# Patient Record
Sex: MALE | Race: WHITE
[De-identification: names, ages, dates, MRNs, and addresses within clinical notes are randomized per-mention and may not be internally consistent; named-entity substitution may affect disease eponyms.]

---

## 2020-06-08 NOTE — NUR
THIS IS A 60 YO M W/ C/O BODY ACHES X3-4 DAYS. PT REPORTS WORSE IN LOW BACK AND 
LEFT SIDE OF NECK 8/10. PT DENIES SOB/CP/PAIN W/ URINATION/COUGH/FEVERS. 
REPORTS NO RECENT TRAVEL. PT DENIES HOME MEDS/MED HX. PT RESTING ON [employer] W/ 
CALL LIGHT IN REACH, CONNECTED TO ALL MONITORING, VSS, NADN.  AT 
BEDSIDE FOR ED EVAL.

## 2020-06-10 NOTE — NUR
THROUGHPUT RN: PT CAME BACK POSITIVE FOR COVID-19. NO INFORMATION FOR ADDRESS, 
PHONE NUMBER, NEXT OF KIN, PERSON TO NOTIFY, UNKNOWN IF PT CAME BY [facility]. SPOKE 
W/ SILVIO FU WHO STATES THEY WILL ASSESS AND LOOK INTO PT SITUATION. [facility] SHOULD 
BE LOOKING INTO THIS AS WELL.

## 2020-08-18 ENCOUNTER — HOSPITAL ENCOUNTER (EMERGENCY)
Dept: HOSPITAL 8 - ED | Age: 60
Discharge: HOME | End: 2020-08-18
Payer: COMMERCIAL

## 2020-08-18 VITALS — HEIGHT: 67 IN | WEIGHT: 179.02 LBS | BODY MASS INDEX: 28.1 KG/M2

## 2020-08-18 VITALS — SYSTOLIC BLOOD PRESSURE: 142 MMHG | DIASTOLIC BLOOD PRESSURE: 80 MMHG
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
## 2020-08-18 DIAGNOSIS — I51.7: ICD-10-CM

## 2020-08-18 DIAGNOSIS — S43.402A: Primary | ICD-10-CM
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
## 2020-08-18 PROCEDURE — 73030 X-RAY EXAM OF SHOULDER: CPT

## 2020-08-18 PROCEDURE — 99283 EMERGENCY DEPT VISIT LOW MDM: CPT

## 2020-08-18 PROCEDURE — 96372 THER/PROPH/DIAG INJ SC/IM: CPT

## 2020-08-18 PROCEDURE — 93005 ELECTROCARDIOGRAM TRACING: CPT

## 2020-08-18 PROCEDURE — 29105 APPLICATION LONG ARM SPLINT: CPT

## 2020-08-18 NOTE — NUR
PT D/C WITH D/C SUMMARY AND SCRIPTS. ALL QUESTIONS ANSWERED. PT AMBULATES TO 
REGISTRATION DESK WITH STEADY GAIT FOR D/C HOME. PT DENIES ANY OTHER NEEDS 
PERTAINING TO THIS VISIT. WORK NOTE PROVIDED BY PROVIDED FOR LIMITATION OF 
ACTIVITY AND REFERRAL TO [facility] OP.

## 2020-08-18 NOTE — NUR
PT MEDICATED PER MAR. PT RESTING COMFORTABLY IN [facility] AT THIS TIME; NAD.

## 2020-11-18 ENCOUNTER — HOSPITAL ENCOUNTER (EMERGENCY)
Dept: HOSPITAL 8 - ED | Age: 60
Discharge: HOME | End: 2020-11-18
Payer: COMMERCIAL

## 2020-11-18 VITALS — DIASTOLIC BLOOD PRESSURE: 74 MMHG | SYSTOLIC BLOOD PRESSURE: 128 MMHG

## 2020-11-18 VITALS — WEIGHT: 184.09 LBS | HEIGHT: 68 IN | BODY MASS INDEX: 27.9 KG/M2

## 2020-11-18 DIAGNOSIS — R94.31: ICD-10-CM

## 2020-11-18 DIAGNOSIS — R53.83: ICD-10-CM

## 2020-11-18 DIAGNOSIS — R51.9: ICD-10-CM

## 2020-11-18 DIAGNOSIS — R11.2: ICD-10-CM

## 2020-11-18 DIAGNOSIS — B34.9: Primary | ICD-10-CM

## 2020-11-18 LAB
ALBUMIN SERPL-MCNC: 3.7 G/DL (ref 3.4–5)
ALP SERPL-CCNC: 78 U/L (ref 45–117)
ALT SERPL-CCNC: 46 U/L (ref 12–78)
ANION GAP SERPL CALC-SCNC: 7 MMOL/L (ref 5–15)
BASOPHILS # BLD AUTO: 0.1 X10^3/UL (ref 0–0.1)
BASOPHILS NFR BLD AUTO: 1 % (ref 0–1)
BILIRUB SERPL-MCNC: 0.8 MG/DL (ref 0.2–1)
CALCIUM SERPL-MCNC: 8.7 MG/DL (ref 8.5–10.1)
CHLORIDE SERPL-SCNC: 112 MMOL/L (ref 98–107)
CREAT SERPL-MCNC: 1.1 MG/DL (ref 0.7–1.3)
EOSINOPHIL # BLD AUTO: 0.1 X10^3/UL (ref 0–0.4)
EOSINOPHIL NFR BLD AUTO: 1 % (ref 1–7)
ERYTHROCYTE [DISTWIDTH] IN BLOOD BY AUTOMATED COUNT: 13.8 % (ref 9.4–14.8)
LYMPHOCYTES # BLD AUTO: 1.4 X10^3/UL (ref 1–3.4)
LYMPHOCYTES NFR BLD AUTO: 21 % (ref 22–44)
MCH RBC QN AUTO: 31.4 PG (ref 27.5–34.5)
MCHC RBC AUTO-ENTMCNC: 34 G/DL (ref 33.2–36.2)
MD: NO
MONOCYTES # BLD AUTO: 0.6 X10^3/UL (ref 0.2–0.8)
MONOCYTES NFR BLD AUTO: 9 % (ref 2–9)
NEUTROPHILS # BLD AUTO: 4.5 X10^3/UL (ref 1.8–6.8)
NEUTROPHILS NFR BLD AUTO: 68 % (ref 42–75)
PLATELET # BLD AUTO: 329 X10^3/UL (ref 130–400)
PMV BLD AUTO: 7.5 FL (ref 7.4–10.4)
PROT SERPL-MCNC: 7.2 G/DL (ref 6.4–8.2)
RBC # BLD AUTO: 4.62 X10^6/UL (ref 4.38–5.82)

## 2020-11-18 PROCEDURE — 36415 COLL VENOUS BLD VENIPUNCTURE: CPT

## 2020-11-18 PROCEDURE — 99285 EMERGENCY DEPT VISIT HI MDM: CPT

## 2020-11-18 PROCEDURE — 96374 THER/PROPH/DIAG INJ IV PUSH: CPT

## 2020-11-18 PROCEDURE — 83690 ASSAY OF LIPASE: CPT

## 2020-11-18 PROCEDURE — 85025 COMPLETE CBC W/AUTO DIFF WBC: CPT

## 2020-11-18 PROCEDURE — 96375 TX/PRO/DX INJ NEW DRUG ADDON: CPT

## 2020-11-18 PROCEDURE — 71045 X-RAY EXAM CHEST 1 VIEW: CPT

## 2020-11-18 PROCEDURE — 80053 COMPREHEN METABOLIC PANEL: CPT

## 2020-11-18 PROCEDURE — 96361 HYDRATE IV INFUSION ADD-ON: CPT

## 2020-11-18 PROCEDURE — 93005 ELECTROCARDIOGRAM TRACING: CPT

## 2020-11-24 ENCOUNTER — HOSPITAL ENCOUNTER (EMERGENCY)
Dept: HOSPITAL 8 - ED | Age: 60
Discharge: HOME | End: 2020-11-24
Payer: COMMERCIAL

## 2020-11-24 VITALS — DIASTOLIC BLOOD PRESSURE: 87 MMHG | SYSTOLIC BLOOD PRESSURE: 119 MMHG

## 2020-11-24 VITALS — WEIGHT: 183.87 LBS | HEIGHT: 68 IN | BODY MASS INDEX: 27.87 KG/M2

## 2020-11-24 DIAGNOSIS — M79.10: Primary | ICD-10-CM

## 2020-11-24 DIAGNOSIS — Z20.828: ICD-10-CM

## 2020-11-24 DIAGNOSIS — R51.9: ICD-10-CM

## 2020-11-24 DIAGNOSIS — R11.2: ICD-10-CM

## 2020-11-24 PROCEDURE — 99283 EMERGENCY DEPT VISIT LOW MDM: CPT

## 2020-11-24 PROCEDURE — 87635 SARS-COV-2 COVID-19 AMP PRB: CPT

## 2021-06-05 ENCOUNTER — HOSPITAL ENCOUNTER (EMERGENCY)
Dept: HOSPITAL 8 - ED | Age: 61
Discharge: HOME | End: 2021-06-05
Payer: SELF-PAY

## 2021-06-05 VITALS — DIASTOLIC BLOOD PRESSURE: 73 MMHG | SYSTOLIC BLOOD PRESSURE: 109 MMHG

## 2021-06-05 VITALS — WEIGHT: 184.53 LBS | HEIGHT: 68 IN | BODY MASS INDEX: 27.97 KG/M2

## 2021-06-05 DIAGNOSIS — K59.00: ICD-10-CM

## 2021-06-05 DIAGNOSIS — K64.8: Primary | ICD-10-CM

## 2021-06-05 LAB
ALBUMIN SERPL-MCNC: 3.8 G/DL (ref 3.4–5)
ANION GAP SERPL CALC-SCNC: 4 MMOL/L (ref 5–15)
BASOPHILS # BLD AUTO: 0.1 X10^3/UL (ref 0–0.1)
BASOPHILS NFR BLD AUTO: 1 % (ref 0–1)
CALCIUM SERPL-MCNC: 8.6 MG/DL (ref 8.5–10.1)
CHLORIDE SERPL-SCNC: 109 MMOL/L (ref 98–107)
CREAT SERPL-MCNC: 1.28 MG/DL (ref 0.7–1.3)
EOSINOPHIL # BLD AUTO: 0.1 X10^3/UL (ref 0–0.4)
EOSINOPHIL NFR BLD AUTO: 2 % (ref 1–7)
ERYTHROCYTE [DISTWIDTH] IN BLOOD BY AUTOMATED COUNT: 13 % (ref 9.4–14.8)
LYMPHOCYTES # BLD AUTO: 2.3 X10^3/UL (ref 1–3.4)
LYMPHOCYTES NFR BLD AUTO: 37 % (ref 22–44)
MCH RBC QN AUTO: 31.9 PG (ref 27.5–34.5)
MCHC RBC AUTO-ENTMCNC: 34.4 G/DL (ref 33.2–36.2)
MD: NO
MONOCYTES # BLD AUTO: 0.6 X10^3/UL (ref 0.2–0.8)
MONOCYTES NFR BLD AUTO: 9 % (ref 2–9)
NEUTROPHILS # BLD AUTO: 3.2 X10^3/UL (ref 1.8–6.8)
NEUTROPHILS NFR BLD AUTO: 51 % (ref 42–75)
PLATELET # BLD AUTO: 261 X10^3/UL (ref 130–400)
PMV BLD AUTO: 7.9 FL (ref 7.4–10.4)
RBC # BLD AUTO: 4.74 X10^6/UL (ref 4.38–5.82)

## 2021-06-05 PROCEDURE — 36415 COLL VENOUS BLD VENIPUNCTURE: CPT

## 2021-06-05 PROCEDURE — 80048 BASIC METABOLIC PNL TOTAL CA: CPT

## 2021-06-05 PROCEDURE — 85025 COMPLETE CBC W/AUTO DIFF WBC: CPT

## 2021-06-05 PROCEDURE — 99283 EMERGENCY DEPT VISIT LOW MDM: CPT

## 2021-06-05 PROCEDURE — 82040 ASSAY OF SERUM ALBUMIN: CPT
